# Patient Record
Sex: FEMALE | Race: WHITE | Employment: UNEMPLOYED | URBAN - METROPOLITAN AREA
[De-identification: names, ages, dates, MRNs, and addresses within clinical notes are randomized per-mention and may not be internally consistent; named-entity substitution may affect disease eponyms.]

---

## 2018-01-08 ENCOUNTER — OFFICE VISIT (OUTPATIENT)
Dept: OBGYN CLINIC | Age: 18
End: 2018-01-08

## 2018-01-08 VITALS
BODY MASS INDEX: 50.02 KG/M2 | WEIGHT: 293 LBS | HEIGHT: 64 IN | SYSTOLIC BLOOD PRESSURE: 124 MMHG | DIASTOLIC BLOOD PRESSURE: 82 MMHG

## 2018-01-08 DIAGNOSIS — Z01.419 WELL FEMALE EXAM WITH ROUTINE GYNECOLOGICAL EXAM: Primary | ICD-10-CM

## 2018-01-08 RX ORDER — DROSPIRENONE AND ETHINYL ESTRADIOL 0.02-3(28)
1 KIT ORAL DAILY
Qty: 3 PACKAGE | Refills: 4 | Status: SHIPPED | OUTPATIENT
Start: 2018-01-08 | End: 2019-06-11 | Stop reason: SDUPTHER

## 2018-01-08 NOTE — PROGRESS NOTES
Annual exam ages 21-44    Ke Engle is a No obstetric history on file. ,  21 y.o. female [facility] Patient's last menstrual period was 01/01/2018 (approximate). .    She presents for her annual checkup. She is having significant dysmenorrhea. With regard to the Gardasil vaccine, she has received all 3 injections. Menstrual status:    Her periods are moderate in flow. She is using three to five pads or tampons per day, usually regular and last 26-30 days. She admits dysmenorrhea. She reports no premenstrual symptoms. Contraception:    The current method of family planning is condoms always. Sexual history:     She  reports that she currently engages in sexual activity and has had male partners. She reports using the following method of birth control/protection: Condom. .    Medical conditions:    Since her last annual GYN exam about one year ago, she has not the following changes in her health history: none. Pap and Mammogram History:    She has never had a pap smear. The patient has never had a mammogram.    Breast Cancer History/Substance Abuse: negative    History reviewed. No pertinent past medical history. History reviewed. No pertinent surgical history. Current Outpatient Prescriptions   Medication Sig Dispense Refill    medroxyPROGESTERone (DEPO-PROVERA) 150 mg/mL injection Administer 1mL by intramuscular route. 1 mL 1    HYDROcodone-acetaminophen (NORCO) 5-325 mg per tablet Take 1 Tab by mouth every four (4) hours as needed for Pain. Max Daily Amount: 6 Tabs. No driving on this medication 12 Tab 0    norgestimate-ethinyl estradiol (ORTHO TRI-CYCLEN, TRI-SPRINTEC) 0.18/0.215/0.25 mg-35 mcg (28) tablet Take 1 tablet by mouth daily. 3 Package 4     Allergies: Review of patient's allergies indicates no known allergies. Tobacco History:  reports that she has been smoking.   She has never used smokeless tobacco.  Alcohol Abuse:  reports that she does not drink alcohol. Drug Abuse:  reports that she does not use illicit drugs.     Family Medical/Cancer History:   Family History   Problem Relation Age of Onset    No Known Problems Mother         Review of Systems - History obtained from the patient  Constitutional: negative for weight loss, fever, night sweats  HEENT: negative for hearing loss, earache, congestion, snoring, sorethroat  CV: negative for chest pain, palpitations, edema  Resp: negative for cough, shortness of breath, wheezing  GI: negative for change in bowel habits, abdominal pain, black or bloody stools  : negative for frequency, dysuria, hematuria, vaginal discharge  MSK: negative for back pain, joint pain, muscle pain  Breast: negative for breast lumps, nipple discharge, galactorrhea  Skin :negative for itching, rash, hives  Neuro: negative for dizziness, headache, confusion, weakness  Psych: negative for anxiety, depression, change in mood  Heme/lymph: negative for bleeding, bruising, pallor    Physical Exam    Visit Vitals    /82    Ht 5' 4\" (1.626 m)    Wt 300 lb (136.1 kg)    LMP 01/01/2018 (Approximate)    BMI 51.49 kg/m2       Constitutional  · Appearance: well-nourished, well developed, alert, in no acute distress    HENT  · Head and Face: appears normal    Neck  · Inspection/Palpation: normal appearance, no masses or tenderness  · Lymph Nodes: no lymphadenopathy present  · Thyroid: gland size normal, nontender, no nodules or masses present on palpation    Chest  · Respiratory Effort: breathing unlabored    Gastrointestinal  · Abdominal Examination: abdomen non-tender to palpation, normal bowel sounds, no masses present  · Liver and spleen: no hepatomegaly present, spleen not palpable  · Hernias: no hernias identified    Skin  · General Inspection: no rash, no lesions identified    Neurologic/Psychiatric  · Mental Status:  · Orientation: grossly oriented to person, place and time  · Mood and Affect: mood normal, affect appropriate    Assessment:  Routine gynecologic examination  Her current medical status is satisfactory with no evidence of significant gynecologic issues. Dysmenorrhea- will start ocps, can take continuously, also discussed taking aleve with placebo pills.     Plan:  Counseled re: diet, exercise, healthy lifestyle  Return for yearly wellness visits

## 2018-01-10 LAB
C TRACH RRNA SPEC QL NAA+PROBE: NEGATIVE
N GONORRHOEA RRNA SPEC QL NAA+PROBE: NEGATIVE
T VAGINALIS RRNA SPEC QL NAA+PROBE: NEGATIVE

## 2018-10-25 ENCOUNTER — OFFICE VISIT (OUTPATIENT)
Dept: FAMILY MEDICINE CLINIC | Age: 18
End: 2018-10-25

## 2018-10-25 VITALS
SYSTOLIC BLOOD PRESSURE: 119 MMHG | BODY MASS INDEX: 42.68 KG/M2 | RESPIRATION RATE: 16 BRPM | WEIGHT: 250 LBS | HEART RATE: 88 BPM | HEIGHT: 64 IN | TEMPERATURE: 98.5 F | DIASTOLIC BLOOD PRESSURE: 80 MMHG | OXYGEN SATURATION: 98 %

## 2018-10-25 DIAGNOSIS — J40 BRONCHITIS: Primary | ICD-10-CM

## 2018-10-25 PROBLEM — E66.01 OBESITY, MORBID (HCC): Status: ACTIVE | Noted: 2018-10-25

## 2018-10-25 RX ORDER — ALBUTEROL SULFATE 90 UG/1
2 AEROSOL, METERED RESPIRATORY (INHALATION)
Qty: 1 INHALER | Refills: 1 | Status: SHIPPED | OUTPATIENT
Start: 2018-10-25 | End: 2022-06-29 | Stop reason: ALTCHOICE

## 2018-10-25 RX ORDER — AZITHROMYCIN 250 MG/1
TABLET, FILM COATED ORAL
Qty: 6 TAB | Refills: 0 | Status: SHIPPED | OUTPATIENT
Start: 2018-10-25 | End: 2018-10-30

## 2018-10-25 NOTE — PROGRESS NOTES
Raffaele James is a 21 y.o. female presenting for Cough (CONGESTION & BACK PAIN DUE TO COUGH   COUGH STARTED ABOUT 2 WEEKS AGO) Awful cough for 2 weeks Went to [facility] ER 3 weeks ago for sore throat and told was viral 
Cough, runny nose and sore throat, HA, Wheezing and dyspnic Sputum producitve, yellow No history of asthma or pulm disease No unusual exposures but works at [employer] office Smokes 1/4 ppd Discussed quitting On ocp's Review of Systems Constitutional: Negative for chills and fever. Respiratory: Positive for cough, sputum production, shortness of breath and wheezing. Negative for hemoptysis. Cardiovascular: Positive for chest pain (with coughing). History reviewed. No pertinent past medical history. History reviewed. No pertinent surgical history. Family History Problem Relation Age of Onset  No Known Problems Mother  No Known Problems Father Social History Socioeconomic History  Marital status: SINGLE Spouse name: Not on file  Number of children: Not on file  Years of education: Not on file  Highest education level: Not on file Social Needs  Financial resource strain: Not on file  Food insecurity - worry: Not on file  Food insecurity - inability: Not on file  Transportation needs - medical: Not on file  Transportation needs - non-medical: Not on file Occupational History  Not on file Tobacco Use  Smoking status: Current Every Day Smoker  Smokeless tobacco: Never Used Substance and Sexual Activity  Alcohol use: No  
 Drug use: No  
 Sexual activity: Yes  
  Partners: Male Birth control/protection: Condom Other Topics Concern  Not on file Social History Narrative  Not on file Current Outpatient Medications Medication Sig Dispense Refill  azithromycin (ZITHROMAX) 250 mg tablet Take 2 tablets today, then take 1 tablet daily 6 Tab 0  
  albuterol (PROVENTIL HFA, VENTOLIN HFA, PROAIR HFA) 90 mcg/actuation inhaler Take 2 Puffs by inhalation every six (6) hours as needed for Wheezing. 1 Inhaler 1  
 drospirenone-ethinyl estradiol (AFIA) 3-0.02 mg tab Take 1 Tab by mouth daily. 3 Package 4  
 HYDROcodone-acetaminophen (NORCO) 5-325 mg per tablet Take 1 Tab by mouth every four (4) hours as needed for Pain. Max Daily Amount: 6 Tabs. No driving on this medication 12 Tab 0 No Known Allergies Vitals:  
 10/25/18 7335 BP: 119/80 Pulse: 88 Resp: 16 Temp: 98.5 °F (36.9 °C) TempSrc: Oral  
SpO2: 98% Weight: 250 lb (113.4 kg) Height: 5' 4\" (1.626 m) Body mass index is 42.91 kg/m². Objective General: Patient alert and oriented and in NAD HEENT: PER/EOMI, no conjunctival pallor or scleral icterus. No thyromegaly or cervical lymphadenopathy Clear rhinorrhea Heart: Regular rate and rhythm, No murmurs, rubs or gallops. Lungs: Clear to auscultation bilaterally, no wheezing, rales or rhonchi Ext: No edema Skin: No rashes or lesions noted on exposed skin Neuro: AAOx3 Psych: Appropriate mood and affect Assessment and Plan: 1. Bronchitis Smoker, duration over 2 weeks will rx with antibx. Home, steam, advil and menthol cough drops QUIT SMOKING!!! 
- azithromycin (ZITHROMAX) 250 mg tablet; Take 2 tablets today, then take 1 tablet daily  Dispense: 6 Tab; Refill: 0 
- albuterol (PROVENTIL HFA, VENTOLIN HFA, PROAIR HFA) 90 mcg/actuation inhaler; Take 2 Puffs by inhalation every six (6) hours as needed for Wheezing. Dispense: 1 Inhaler; Refill: 1 May see us PRN for weight management-discussed Diagnosis and plan discussed with patient who verbillized understanding. Follow-up Disposition: Not on File [facility]

## 2018-10-25 NOTE — PROGRESS NOTES
Identified pt with two pt identifiers(name and ). Chief Complaint Patient presents with  Cough CONGESTION & BACK PAIN DUE TO COUGH   COUGH STARTED ABOUT 2 WEEKS AGO Health Maintenance Due Topic  DTaP/Tdap/Td series (1 - Tdap)  HPV Age 9Y-34Y (1 - Female 3-dose series)  Pneumococcal 19-64 Medium Risk (1 of 1 - PPSV23)  Influenza Age 5 to Adult Wt Readings from Last 3 Encounters:  
10/25/18 250 lb (113.4 kg) (>99 %, Z= 2.52)*  
18 300 lb (136.1 kg) (>99 %, Z= 2.80)*  
16 260 lb 11.2 oz (118.3 kg) (>99 %, Z= 2.52)* * Growth percentiles are based on CDC (Girls, 2-20 Years) data. Temp Readings from Last 3 Encounters:  
10/25/18 98.5 °F (36.9 °C) (Oral) 12/05/15 98.2 °F (36.8 °C) BP Readings from Last 3 Encounters:  
10/25/18 119/80 (74 %, Z = 0.66 /  94 %, Z = 1.58)*  
18 124/82 (86 %, Z = 1.09 /  96 %, Z = 1.78)*  
12/06/15 121/67 (85 %, Z = 1.03 /  56 %, Z = 0.15)*  
 
*BP percentiles are based on the 2017 AAP Clinical Practice Guideline for girls Pulse Readings from Last 3 Encounters:  
10/25/18 88  
12/05/15 112 Learning Assessment: 
:  
 
No flowsheet data found. Depression Screening: 
:  
 
PHQ over the last two weeks 10/25/2018 Little interest or pleasure in doing things Not at all Feeling down, depressed, irritable, or hopeless Not at all Total Score PHQ 2 0 Fall Risk Assessment: 
:  
 
No flowsheet data found. Abuse Screening: 
:  
 
No flowsheet data found. Coordination of Care Questionnaire: 
:  
 
1) Have you been to an emergency room, urgent care clinic since your last visit? YES   [place]  SORE THROAT/PHARYNGITIS Hospitalized since your last visit? NO          
 
2) Have you seen or consulted any other health care providers outside of [address] [place] [address] since your last visit? NO 
  (Include any pap smears or colon screenings in this section.)  NO Patient is accompanied by self I have received verbal consent from [facility] to discuss any/all medical information while they are present in the room.

## 2018-10-31 ENCOUNTER — DOCUMENTATION ONLY (OUTPATIENT)
Dept: FAMILY MEDICINE CLINIC | Age: 18
End: 2018-10-31

## 2018-10-31 NOTE — PROGRESS NOTES
KATHLEEN Fields faxed a rejection letter from the insurance on the Proventil  inhaler. Ms. Priscilla Boyd is aware of this. She states that she doesn't need it at this time, she is on an antibiotic and is improving.

## 2019-06-11 ENCOUNTER — OFFICE VISIT (OUTPATIENT)
Dept: OBGYN CLINIC | Age: 19
End: 2019-06-11

## 2019-06-11 VITALS
HEIGHT: 64 IN | DIASTOLIC BLOOD PRESSURE: 82 MMHG | BODY MASS INDEX: 49.51 KG/M2 | SYSTOLIC BLOOD PRESSURE: 124 MMHG | WEIGHT: 290 LBS

## 2019-06-11 DIAGNOSIS — N91.2 AMENORRHEA: ICD-10-CM

## 2019-06-11 DIAGNOSIS — Z01.419 WELL FEMALE EXAM WITH ROUTINE GYNECOLOGICAL EXAM: Primary | ICD-10-CM

## 2019-06-11 DIAGNOSIS — Z11.3 SCREENING EXAMINATION FOR SEXUALLY TRANSMITTED DISEASE: ICD-10-CM

## 2019-06-11 LAB
HCG URINE, QL. (POC): NEGATIVE
VALID INTERNAL CONTROL?: YES

## 2019-06-11 RX ORDER — MEDROXYPROGESTERONE ACETATE 10 MG/1
10 TABLET ORAL DAILY
Qty: 10 TAB | Refills: 0 | Status: SHIPPED | OUTPATIENT
Start: 2019-06-11 | End: 2019-06-11 | Stop reason: SDUPTHER

## 2019-06-11 RX ORDER — MEDROXYPROGESTERONE ACETATE 10 MG/1
10 TABLET ORAL DAILY
Qty: 10 TAB | Refills: 0 | Status: SHIPPED | OUTPATIENT
Start: 2019-06-11 | End: 2022-06-29

## 2019-06-11 RX ORDER — DROSPIRENONE AND ETHINYL ESTRADIOL 0.02-3(28)
1 KIT ORAL DAILY
Qty: 3 PACKAGE | Refills: 4 | Status: SHIPPED | OUTPATIENT
Start: 2019-06-11 | End: 2019-12-18

## 2019-06-11 NOTE — PATIENT INSTRUCTIONS
Safer Sex: Care Instructions  Your Care Instructions  Safer sex is a way to reduce your risk of getting an infection spread through sex. It can also help prevent pregnancy. Most infections that are spread through sex, also called sexually transmitted infections or STIs, can be cured. But some can decrease your chances of getting pregnant if they are not treated early. Others, such as herpes, have no cure. And some, such as HIV, can be deadly. Several products can help you practice safer sex and reduce your chance of STIs. One of the best is a condom. There are condoms for men and for women. The female condom is a tube of soft plastic with a closed end that is placed deep into the vagina. You can use a special rubber sheet (dental dam) for protection during oral sex. Latex gloves can keep your hands from touching blood, semen, or other body fluids that can carry infections. Remember that birth control methods such as diaphragms, IUDs, foams, and birth control pills do not stop you from getting STIs. Follow-up care is a key part of your treatment and safety. Be sure to make and go to all appointments, and call your doctor if you are having problems. It's also a good idea to know your test results and keep a list of the medicines you take. How can you care for yourself at home? · Think about getting shots to prevent hepatitis A and hepatitis B. These two diseases can be spread through sex. You also can get hepatitis A if you eat infected food. · Use condoms or female condoms each time and every time you have sex. · Learn the right way to use a male condom:  ? Condoms come in several sizes. Make sure you use the right size. A condom that is too small can break easily. A condom that is too big can slip off during sex. Use a new condom each time you have sex. ? Be careful not to poke a hole in the condom when you open the wrapper. ? Squeeze the tip of the condom to keep out air.   ? Pull down the loose skin (foreskin) from the head of an uncircumcised penis. ? While squeezing the tip of the condom, unroll it all the way down to the base of the firm penis. ? Never use petroleum jelly (such as Vaseline), grease, hand lotion, baby oil, or anything with oil in it. These products can make holes in the condom. ? After sex, hold the condom on your penis as you remove your penis from your partner. This will keep semen from spilling out of the condom. · Learn to use a female condom:  ? You can put in a female condom up to 8 hours before sex. ? Squeeze the smaller ring at the closed end and insert it deep into the vagina. The larger ring at the open end should stay outside the vagina. ? During sex, make sure the penis goes into the condom. ? After the penis is removed, close the open end of the condom by twisting it. Remove the condom. · Do not use a female condom and male condom at the same time. · Do not have sex with anyone who has symptoms of an STI, such as sores on the genitals or mouth. The herpes virus that causes cold sores can spread to and from the penis and vagina. · Do not drink a lot of alcohol or use drugs before sex. This can cause you to let down your guard and not practice safer sex. · Having one sex partner (who does not have STIs and does not have sex with anyone else) is a sure way to avoid STIs. · Talk to your partner before you have sex. Find out if he or she has or is at risk for any STI. Keep in mind that a person may be able to spread an STI even if he or she does not have symptoms. You and your partner may want to get an HIV test. You should get tested again 6 months later. Where can you learn more? Go to http://selena-charles.info/. Enter H795 in the search box to learn more about \"Safer Sex: Care Instructions. \"  Current as of: September 11, 2018  Content Version: 11.9  © 7543-9275 [employer].  Care instructions adapted under license by [employer] (which disclaims liability or warranty for this information). If you have questions about a medical condition or this instruction, always ask your healthcare professional. [address] any warranty or liability for your use of this information.

## 2019-06-11 NOTE — PROGRESS NOTES
Annual exam ages [age]    Rosario San is a No obstetric history on file. ,  19 y.o. female [facility] No LMP recorded. .    She presents for her annual checkup. She is having no significant problems. She has not had a period since stopping OCP's 4 months ago. With regard to the Gardasil vaccine, she has received all 3 injections. Menstrual status:    Her periods are have been absent since stopping OCP's 4 months ago. She denies dysmenorrhea. She reports no premenstrual symptoms. Contraception:    The current method of family planning is condoms. Sexual history:     She  reports that she currently engages in sexual activity and has had partners who are Male. She reports using the following method of birth control/protection: Condom. .    Medical conditions:    Since her last annual GYN exam about one year ago, she has not the following changes in her health history: none. Pap and Mammogram History:    She has never had a pap smear. The patient has never had a mammogram.    Breast Cancer History/Substance Abuse: negative    History reviewed. No pertinent past medical history. History reviewed. No pertinent surgical history. Current Outpatient Medications   Medication Sig Dispense Refill    albuterol (PROVENTIL HFA, VENTOLIN HFA, PROAIR HFA) 90 mcg/actuation inhaler Take 2 Puffs by inhalation every six (6) hours as needed for Wheezing. 1 Inhaler 1    drospirenone-ethinyl estradiol (AFIA) 3-0.02 mg tab Take 1 Tab by mouth daily. 3 Package 4    HYDROcodone-acetaminophen (NORCO) 5-325 mg per tablet Take 1 Tab by mouth every four (4) hours as needed for Pain. Max Daily Amount: 6 Tabs. No driving on this medication 12 Tab 0     Allergies: Patient has no known allergies. Tobacco History:  reports that she has been smoking. She has never used smokeless tobacco.  Alcohol Abuse:  reports that she does not drink alcohol.   Drug Abuse:  reports that she does not use drugs.     Family Medical/Cancer History:   Family History   Problem Relation Age of Onset    No Known Problems Mother     No Known Problems Father         Review of Systems - History obtained from the patient  Constitutional: negative for weight loss, fever, night sweats  HEENT: negative for hearing loss, earache, congestion, snoring, sorethroat  CV: negative for chest pain, palpitations, edema  Resp: negative for cough, shortness of breath, wheezing  GI: negative for change in bowel habits, abdominal pain, black or bloody stools  : negative for frequency, dysuria, hematuria, vaginal discharge  MSK: negative for back pain, joint pain, muscle pain  Breast: negative for breast lumps, nipple discharge, galactorrhea  Skin :negative for itching, rash, hives  Neuro: negative for dizziness, headache, confusion, weakness  Psych: negative for anxiety, depression, change in mood  Heme/lymph: negative for bleeding, bruising, pallor    Physical Exam    Visit Vitals  /82   Ht 5' 4\" (1.626 m)   Wt 290 lb (131.5 kg)   BMI 49.78 kg/m²       Constitutional  · Appearance: well-nourished, well developed, alert, in no acute distress    HENT  · Head and Face: appears normal    Neck  · Inspection/Palpation: normal appearance, no masses or tenderness  · Lymph Nodes: no lymphadenopathy present  · Thyroid: gland size normal, nontender, no nodules or masses present on palpation    Chest  · Respiratory Effort: breathing unlabored    Gastrointestinal  · Abdominal Examination: abdomen non-tender to palpation, normal bowel sounds, no masses present  · Liver and spleen: no hepatomegaly present, spleen not palpable  · Hernias: no hernias identified    Skin  · General Inspection: no rash, no lesions identified    Neurologic/Psychiatric  · Mental Status:  · Orientation: grossly oriented to person, place and time  · Mood and Affect: mood normal, affect appropriate    Assessment:  Routine gynecologic examination  Her current medical status is satisfactory with no evidence of significant gynecologic issues. Amenorrhea- likely anovulatory, will give provera withdrawal and start ocps, will check pcos labs, tsh, and prolactin.     Plan:  Counseled re: diet, exercise, healthy lifestyle  Return for yearly wellness visits

## 2019-06-13 LAB
17OHP SERPL-MCNC: 70 NG/DL
ALBUMIN SERPL-MCNC: 4.5 G/DL (ref 3.5–5.5)
ALBUMIN/GLOB SERPL: 1.6 {RATIO} (ref 1.2–2.2)
ALP SERPL-CCNC: 71 IU/L (ref 39–117)
ALT SERPL-CCNC: 21 IU/L (ref 0–32)
AST SERPL-CCNC: 18 IU/L (ref 0–40)
BILIRUB SERPL-MCNC: 0.4 MG/DL (ref 0–1.2)
BUN SERPL-MCNC: 11 MG/DL (ref 6–20)
BUN/CREAT SERPL: 14 (ref 9–23)
C TRACH RRNA SPEC QL NAA+PROBE: NEGATIVE
CALCIUM SERPL-MCNC: 9.3 MG/DL (ref 8.7–10.2)
CHLORIDE SERPL-SCNC: 102 MMOL/L (ref 96–106)
CO2 SERPL-SCNC: 24 MMOL/L (ref 20–29)
CREAT SERPL-MCNC: 0.81 MG/DL (ref 0.57–1)
FT4I SERPL CALC-MCNC: 1.6 (ref 1.2–4.9)
GLOBULIN SER CALC-MCNC: 2.8 G/DL (ref 1.5–4.5)
GLUCOSE SERPL-MCNC: 84 MG/DL (ref 65–99)
N GONORRHOEA RRNA SPEC QL NAA+PROBE: NEGATIVE
POTASSIUM SERPL-SCNC: 4.4 MMOL/L (ref 3.5–5.2)
PROLACTIN SERPL-MCNC: 13 NG/ML (ref 4.8–23.3)
PROT SERPL-MCNC: 7.3 G/DL (ref 6–8.5)
SODIUM SERPL-SCNC: 141 MMOL/L (ref 134–144)
T VAGINALIS RRNA VAG QL NAA+PROBE: NEGATIVE
T3RU NFR SERPL: 23 % (ref 24–39)
T4 SERPL-MCNC: 7.1 UG/DL (ref 4.5–12)
TESTOST FREE SERPL-MCNC: 6.3 PG/ML (ref 0–4.2)
TESTOST SERPL-MCNC: 68 NG/DL (ref 8–48)
TSH SERPL DL<=0.005 MIU/L-ACNC: 1.87 UIU/ML (ref 0.45–4.5)

## 2019-12-18 ENCOUNTER — TELEPHONE (OUTPATIENT)
Dept: OBGYN CLINIC | Age: 19
End: 2019-12-18

## 2019-12-18 RX ORDER — DROSPIRENONE AND ETHINYL ESTRADIOL 0.02-3(28)
KIT ORAL
Qty: 3 PACKAGE | Refills: 3 | Status: SHIPPED | OUTPATIENT
Start: 2019-12-18 | End: 2022-06-29

## 2019-12-18 NOTE — LETTER
12/19/2019 10:22 AM 
 
Ms. [address] [address] To whom it may concern, 
Ms Pratibha Abad is under my care and she may be excused from work for today 12/19/19. Ms Pratibha Abad can return to work on 12/20/19 without restrictions. Please call the office with any further questions or concerns at [phone]. Thank you Sincerely, Nida Ohara MD

## 2019-12-18 NOTE — TELEPHONE ENCOUNTER
Call received at 11:25am      22year old last seen in the office on 6/11/19 . Patient was advised of MD reviewed labs for PCOS. Patient had a prescription sent at that visit that did not go( print)    Patient calling to say that she had not had any bleeding till this am and now she is bleeding since about 5am and has gone through a super plus tampon in one hour and has cramping at 10 on the pain scale of  1-10. Patient advised of prescription to be sent to pharmacy for ocp taper and how to take the medication. Patient advised to take rx with food. Patient advised she can take motrin 600mg every 4-6 hours ( OTC)    Patient repeated back how take the medication. Patient provided with bleeding and pain precautions. Prescription sent as per MD order to patient confirmed pharmacy. Patient verbalized understanding.

## 2019-12-19 NOTE — TELEPHONE ENCOUNTER
It takes a little bit for the taper to kick in, it should get better today. Can take ibuprofen 600 mg 1 po q6 hours if needed and use warm compresses. Needs to check UPT and call back asap if positive. Can have a note to be out of work. Also needs to schedule an appointment with an ultrasound (once bleeding has decreased. If bleeding increases also needs to call back then. Should remain on the birth control after bleeding resolves to help cycle her in the future so that she does not go so long without a period.

## 2019-12-19 NOTE — TELEPHONE ENCOUNTER
Patient advised of MD recommendations and verbalized understanding. Letter composed as per MD order, printed,and signed and waiting for patient to call back with a fax number to fax the letter. Patient verbalized understanding regarding MD recommendations.

## 2019-12-19 NOTE — TELEPHONE ENCOUNTER
Call received at 8:45am      22year old patient calling back again to say that she is still having pain at 9 on the pain scale of 1-10 and is changing a tampon every 1.5 hours. Patient is taking the ocp taper and has taken 2 pills twice yesterday and 2 pills today    Patient is taking extra strength midol with minimal relief. Patient is stating she cant go to work and needs a note for work    ? ov    Please advise

## 2019-12-19 NOTE — TELEPHONE ENCOUNTER
This nurse called the patient back and to get the fax number.   Letter faxed to patient provided fax number of [phone]      Fax confirmation received

## 2020-11-01 ENCOUNTER — APPOINTMENT (OUTPATIENT)
Dept: GENERAL RADIOLOGY | Age: 20
End: 2020-11-01
Attending: EMERGENCY MEDICINE
Payer: COMMERCIAL

## 2020-11-01 ENCOUNTER — HOSPITAL ENCOUNTER (EMERGENCY)
Age: 20
Discharge: HOME | End: 2020-11-01
Attending: EMERGENCY MEDICINE
Payer: COMMERCIAL

## 2020-11-01 ENCOUNTER — APPOINTMENT (OUTPATIENT)
Dept: CT IMAGING | Age: 20
End: 2020-11-01
Attending: EMERGENCY MEDICINE
Payer: COMMERCIAL

## 2020-11-01 VITALS
HEART RATE: 125 BPM | TEMPERATURE: 98.6 F | OXYGEN SATURATION: 99 % | SYSTOLIC BLOOD PRESSURE: 121 MMHG | RESPIRATION RATE: 20 BRPM | BODY MASS INDEX: 44.98 KG/M2 | WEIGHT: 270 LBS | DIASTOLIC BLOOD PRESSURE: 76 MMHG | HEIGHT: 65 IN

## 2020-11-01 DIAGNOSIS — S00.03XA CONTUSION OF SCALP, INITIAL ENCOUNTER: Primary | ICD-10-CM

## 2020-11-01 DIAGNOSIS — S63.617A SPRAIN OF LEFT LITTLE FINGER, UNSPECIFIED SITE OF DIGIT, INITIAL ENCOUNTER: ICD-10-CM

## 2020-11-01 PROCEDURE — 70450 CT HEAD/BRAIN W/O DYE: CPT

## 2020-11-01 PROCEDURE — 73130 X-RAY EXAM OF HAND: CPT

## 2020-11-01 PROCEDURE — 99284 EMERGENCY DEPT VISIT MOD MDM: CPT

## 2020-11-01 PROCEDURE — 74011250637 HC RX REV CODE- 250/637: Performed by: EMERGENCY MEDICINE

## 2020-11-01 RX ORDER — IBUPROFEN 800 MG/1
800 TABLET ORAL
Qty: 20 TAB | Refills: 0 | Status: SHIPPED | OUTPATIENT
Start: 2020-11-01 | End: 2020-11-08

## 2020-11-01 RX ORDER — IBUPROFEN 800 MG/1
800 TABLET ORAL ONCE
Status: COMPLETED | OUTPATIENT
Start: 2020-11-01 | End: 2020-11-01

## 2020-11-01 RX ORDER — ACETAMINOPHEN 500 MG
1000 TABLET ORAL ONCE
Status: COMPLETED | OUTPATIENT
Start: 2020-11-01 | End: 2020-11-01

## 2020-11-01 RX ADMIN — IBUPROFEN 800 MG: 800 TABLET ORAL at 03:40

## 2020-11-01 RX ADMIN — ACETAMINOPHEN 1000 MG: 500 TABLET, FILM COATED ORAL at 03:40

## 2020-11-01 NOTE — ED PROVIDER NOTES
EMERGENCY DEPARTMENT HISTORY AND PHYSICAL EXAM      Date: 11/1/2020  Patient Name: Lucie Castleman    History of Presenting Illness     Chief Complaint   Patient presents with    Headache     pt states she fell out of a moving truck tonight and L 5th finger pain as well. Pt states she has been drinking 5-6 beers tonight       History Provided By: Patient    HPI: Lucie Castleman, 23 y.o. female   presents to the ED with cc of headache and hand pain. Patient states that she has been drinking EtOH and fell out of a moving truck to spinal . Patient states that she landed on her head and the left hand. Patient complains of mild headache. Patient denies LOC. No visual changes. Patient complains of left fifth finger pain. No neck pain or back pain. PCP: Michael Enrique MD    No current facility-administered medications on file prior to encounter. Current Outpatient Medications on File Prior to Encounter   Medication Sig Dispense Refill    drospirenone-ethinyl estradiol (AFIA) 3-0.02 mg tab Take 2 pills twice a day for two days then, take on pill twice a day for 2 days then, take one pill a day, skip the placebos and start the new pack- take on every day from the new pack 3 Package 3    medroxyPROGESTERone (PROVERA) 10 mg tablet Take 1 Tab by mouth daily. 10 Tab 0    albuterol (PROVENTIL HFA, VENTOLIN HFA, PROAIR HFA) 90 mcg/actuation inhaler Take 2 Puffs by inhalation every six (6) hours as needed for Wheezing. 1 Inhaler 1    HYDROcodone-acetaminophen (NORCO) 5-325 mg per tablet Take 1 Tab by mouth every four (4) hours as needed for Pain. Max Daily Amount: 6 Tabs. No driving on this medication 12 Tab 0       Past History     Past Medical History:  Past Medical History:   Diagnosis Date    PCOS (polycystic ovarian syndrome)        Past Surgical History:  History reviewed. No pertinent surgical history.     Family History:  Family History   Problem Relation Age of Onset    No Known Problems Mother     No Known Problems Father        Social History:  Social History     Tobacco Use    Smoking status: Current Every Day Smoker     Packs/day: 0.50    Smokeless tobacco: Never Used   Substance Use Topics    Alcohol use: Yes     Alcohol/week: 5.0 standard drinks     Types: 5 Cans of beer per week    Drug use: No       Allergies:  No Known Allergies      Review of Systems   Review of Systems   Constitutional: Negative for chills and fever. HENT: Negative for nosebleeds. Respiratory: Negative for shortness of breath. Cardiovascular: Negative for chest pain. Gastrointestinal: Negative for nausea. Endocrine: Negative for polyuria. Genitourinary: Negative for dysuria. Musculoskeletal: Negative for back pain and neck pain. Skin: Negative for rash. Neurological: Positive for headaches. All other systems reviewed and are negative. Physical Exam   Physical Exam  Vitals signs and nursing note reviewed. Constitutional:       General: She is not in acute distress. Appearance: Normal appearance. HENT:      Head: Normocephalic and atraumatic. Comments: Mild occipital scalp tenderness without hematoma or depressed skull     Right Ear: External ear normal.      Left Ear: External ear normal.      Nose: Nose normal.      Mouth/Throat:      Mouth: Mucous membranes are moist.   Eyes:      Extraocular Movements: Extraocular movements intact. Pupils: Pupils are equal, round, and reactive to light. Neck:      Musculoskeletal: Neck supple. Cardiovascular:      Rate and Rhythm: Normal rate and regular rhythm. Heart sounds: Normal heart sounds. Pulmonary:      Effort: Pulmonary effort is normal.      Breath sounds: Normal breath sounds. Abdominal:      General: Abdomen is flat. Bowel sounds are normal.      Palpations: Abdomen is soft. Musculoskeletal: Normal range of motion. General: No tenderness, deformity or signs of injury.       Comments: Left hand without deformity or swelling. Left fifth finger with a painful ROM without swelling or deformity   Skin:     General: Skin is warm and dry. Neurological:      General: No focal deficit present. Mental Status: She is alert and oriented to person, place, and time. Psychiatric:         Behavior: Behavior normal.         Diagnostic Study Results     Labs -   No results found for this or any previous visit (from the past 12 hour(s)). Radiologic Studies -   XR HAND LT MIN 3 V   Final Result   Impression:   No acute osseous abnormality. CT HEAD WO CONT   Final Result   Impression:   No acute intracranial abnormality. CT Results  (Last 48 hours)               11/01/20 0310  CT HEAD WO CONT Final result    Impression:  Impression:   No acute intracranial abnormality. Narrative:  Study: Head CT without contrast.       Clinical indication: Head injury. Technique: Routine volume acquisition of the head was obtained without IV   contrast. Coronal and sagittal reformations were generated in soft tissue and   bone kernels. Dose reduction: All CT scans at this facility are performed using   dose reduction optimization techniques as appropriate to a performed exam   including the following: Automated exposure control, adjustments of the mA   and/or kV according to patient size, or use of iterative reconstruction   technique. Comparison: None available. Findings:        No evidence of acute intracranial hemorrhage, mass effect, midline shift or   abnormal extra-axial fluid collection. Ventricles and basal cisterns are   preserved and are symmetric. Gray-white matter differentiation is maintained. Extracalvarial soft tissues are unremarkable. No evidence of skull fracture. Visualized paranasal sinuses and mastoid air cells are clear. Globes and orbits   are intact.                    CXR Results  (Last 48 hours)    None            Medical Decision Making   I am the first provider for this patient. I reviewed the vital signs, available nursing notes, past medical history, past surgical history, family history and social history. Vital Signs-Reviewed the patient's vital signs. Patient Vitals for the past 12 hrs:   Temp Pulse Resp BP SpO2   11/01/20 0242 98.6 °F (37 °C) (!) 125 20 121/76 99 %       Records Reviewed:     Provider Notes (Medical Decision Making):       ED Course:   Initial assessment performed. The patients presenting problems have been discussed, and they are in agreement with the care plan formulated and outlined with them. I have encouraged them to ask questions as they arise throughout their visit. PROCEDURES        PLAN:  1. Current Discharge Medication List        2. Follow-up Information     Follow up With Specialties Details Why Contact Info    Follow up with your primary care physician  Schedule an appointment as soon as possible for a visit in 3 days As needed         Return to ED if worse     Diagnosis     Clinical Impression:   1. Contusion of scalp, initial encounter    2.  Sprain of left little finger, unspecified site of digit, initial encounter

## 2022-03-20 PROBLEM — E66.01 OBESITY, MORBID (HCC): Status: ACTIVE | Noted: 2018-10-25

## 2022-06-08 ENCOUNTER — HOSPITAL ENCOUNTER (EMERGENCY)
Age: 22
Discharge: HOME | End: 2022-06-08
Attending: EMERGENCY MEDICINE
Payer: COMMERCIAL

## 2022-06-08 VITALS
HEART RATE: 90 BPM | DIASTOLIC BLOOD PRESSURE: 95 MMHG | TEMPERATURE: 98.3 F | HEIGHT: 65 IN | RESPIRATION RATE: 18 BRPM | OXYGEN SATURATION: 100 % | BODY MASS INDEX: 43.32 KG/M2 | WEIGHT: 260 LBS | SYSTOLIC BLOOD PRESSURE: 130 MMHG

## 2022-06-08 DIAGNOSIS — L03.115 CELLULITIS OF RIGHT LOWER EXTREMITY: Primary | ICD-10-CM

## 2022-06-08 PROCEDURE — 99284 EMERGENCY DEPT VISIT MOD MDM: CPT

## 2022-06-08 PROCEDURE — 96372 THER/PROPH/DIAG INJ SC/IM: CPT

## 2022-06-08 PROCEDURE — 74011250636 HC RX REV CODE- 250/636: Performed by: EMERGENCY MEDICINE

## 2022-06-08 RX ORDER — KETOROLAC TROMETHAMINE 10 MG/1
10 TABLET, FILM COATED ORAL
Qty: 20 TABLET | Refills: 0 | Status: SHIPPED | OUTPATIENT
Start: 2022-06-08 | End: 2022-06-08 | Stop reason: SDUPTHER

## 2022-06-08 RX ORDER — SULFAMETHOXAZOLE AND TRIMETHOPRIM 800; 160 MG/1; MG/1
2 TABLET ORAL 2 TIMES DAILY
Qty: 28 TABLET | Refills: 0 | Status: SHIPPED | OUTPATIENT
Start: 2022-06-08 | End: 2022-06-08 | Stop reason: SDUPTHER

## 2022-06-08 RX ORDER — SULFAMETHOXAZOLE AND TRIMETHOPRIM 800; 160 MG/1; MG/1
2 TABLET ORAL 2 TIMES DAILY
Qty: 28 TABLET | Refills: 0 | Status: SHIPPED | OUTPATIENT
Start: 2022-06-08 | End: 2022-06-15

## 2022-06-08 RX ORDER — KETOROLAC TROMETHAMINE 30 MG/ML
30 INJECTION, SOLUTION INTRAMUSCULAR; INTRAVENOUS
Status: COMPLETED | OUTPATIENT
Start: 2022-06-08 | End: 2022-06-08

## 2022-06-08 RX ORDER — KETOROLAC TROMETHAMINE 10 MG/1
10 TABLET, FILM COATED ORAL
Qty: 20 TABLET | Refills: 0 | Status: SHIPPED | OUTPATIENT
Start: 2022-06-08 | End: 2022-06-13

## 2022-06-08 RX ADMIN — KETOROLAC TROMETHAMINE 30 MG: 30 INJECTION, SOLUTION INTRAMUSCULAR; INTRAVENOUS at 05:39

## 2022-06-08 NOTE — ED TRIAGE NOTES
Patient presents with right foot pain. [facility] and was dx with cellulitis. Did not get RX filled. Aleve taken at 0330 today. Symptoms began Sunday.

## 2022-06-08 NOTE — Clinical Note
[address] [facility] EMERGENCY DEPARTMENT  [address] [facility] [address]  [phone]    Work/School Note    Date: 6/8/2022    To Whom It May concern:    Ileana Trivedi was seen and treated today in the emergency room by the following provider(s):  Attending Provider: Moon Doty MD.      Ileana Trivedi is excused from work/school on 06/08/22 and 06/09/22. She is medically clear to return to work/school on 6/10/2022.        Sincerely,          Bala Walters MD

## 2022-06-08 NOTE — ED PROVIDER NOTES
EMERGENCY DEPARTMENT HISTORY AND PHYSICAL EXAM      Date: 6/8/2022  Patient Name: Alee Wiley    History of Presenting Illness     Chief Complaint   Patient presents with    Foot Pain    Skin Problem       History Provided By: Patient    HPI: Alee Wiley, 19 y.o. female with a past medical history significant No significant past medical history presents to the ED with cc of right foot pain and redness x3 days. Patient states she was seen yesterday at an urgent care facility and was discharged with Keflex but did not have a chance to fill it. Patient reports the pain worsened this morning at the base of her first toe, worse with palpation or movement, improved with rest.    There are no other complaints, changes, or physical findings at this time. PCP: Jose Angel Butts MD    No current facility-administered medications on file prior to encounter. Current Outpatient Medications on File Prior to Encounter   Medication Sig Dispense Refill    drospirenone-ethinyl estradiol (AFIA) 3-0.02 mg tab Take 2 pills twice a day for two days then, take on pill twice a day for 2 days then, take one pill a day, skip the placebos and start the new pack- take on every day from the new pack (Patient not taking: Reported on 6/8/2022) 3 Package 3    medroxyPROGESTERone (PROVERA) 10 mg tablet Take 1 Tab by mouth daily. (Patient not taking: Reported on 6/8/2022) 10 Tab 0    albuterol (PROVENTIL HFA, VENTOLIN HFA, PROAIR HFA) 90 mcg/actuation inhaler Take 2 Puffs by inhalation every six (6) hours as needed for Wheezing. (Patient not taking: Reported on 6/8/2022) 1 Inhaler 1    HYDROcodone-acetaminophen (NORCO) 5-325 mg per tablet Take 1 Tab by mouth every four (4) hours as needed for Pain. Max Daily Amount: 6 Tabs.  No driving on this medication (Patient not taking: Reported on 6/8/2022) 12 Tab 0       Past History     Past Medical History:  Past Medical History:   Diagnosis Date    PCOS (polycystic ovarian syndrome) Past Surgical History:  History reviewed. No pertinent surgical history. Family History:  Family History   Problem Relation Age of Onset    No Known Problems Mother     No Known Problems Father        Social History:  Social History     Tobacco Use    Smoking status: Former Smoker     Packs/day: 0.50    Smokeless tobacco: Never Used    Tobacco comment: Quit 3 months ago - March 2022   Substance Use Topics    Alcohol use: Yes     Alcohol/week: 5.0 standard drinks     Types: 5 Cans of beer per week     Comment: occasionally    Drug use: No       Allergies:  No Known Allergies      Review of Systems   Review of Systems   Constitutional: Negative for chills and fever. HENT: Negative for sinus pressure and sinus pain. Eyes: Negative for photophobia and redness. Respiratory: Negative for shortness of breath and wheezing. Cardiovascular: Negative for chest pain and palpitations. Gastrointestinal: Negative for abdominal pain and nausea. Genitourinary: Negative for flank pain and hematuria. Musculoskeletal: Negative for arthralgias and gait problem. Skin: Positive for redness, negative for pallor. Neurological: Negative for dizziness and weakness. Review of Systems    Physical Exam   Physical Exam  Constitutional:       General: No acute distress. Appearance: Normal appearance. Not toxic-appearing. HENT:      Head: Normocephalic and atraumatic. Nose: Nose normal.      Mouth/Throat:      Mouth: Mucous membranes are moist.   Eyes:      Extraocular Movements: Extraocular movements intact. Pupils: Pupils are equal, round, and reactive to light. Cardiovascular:      Rate and Rhythm: Normal rate. Pulses: Normal pulses. Pulmonary:      Effort: Pulmonary effort is normal.      Breath sounds: No stridor. Abdominal:      General: Abdomen is flat. There is no distension. Musculoskeletal:         General: Normal range of motion.       Cervical back: Normal range of motion and neck supple. Skin:     General: Skin is warm and dry. 1.5 cm circular area of erythema and redness at the plantar side of the base of the first toe which is blanchable, no fluctuance or abscess noted     Capillary Refill: Capillary refill takes less than 2 seconds. Neurological:      General: No focal deficit present. Mental Status: Aert and oriented to person, place, and time. Psychiatric:         Mood and Affect: Mood normal.         Behavior: Behavior normal.       Physical Exam    Lab and Diagnostic Study Results     Labs -   No results found for this or any previous visit (from the past 12 hour(s)). Radiologic Studies -   @lastxrresult@  CT Results  (Last 48 hours)    None        CXR Results  (Last 48 hours)    None            Medical Decision Making   - I am the first provider for this patient. - I reviewed the vital signs, available nursing notes, past medical history, past surgical history, family history and social history. - Initial assessment performed. The patients presenting problems have been discussed, and they are in agreement with the care plan formulated and outlined with them. I have encouraged them to ask questions as they arise throughout their visit. Vital Signs-Reviewed the patient's vital signs. Patient Vitals for the past 12 hrs:   Temp Pulse Resp BP SpO2   06/08/22 0514 98.3 °F (36.8 °C) 90 18 (!) 130/95 100 %       Provider Notes (Medical Decision Making):   Patient brought her records from the urgent care facility which showed a white count of 13, an unremarkable x-ray of the foot. Discussed plan to start Bactrim as well as Keflex, cc over-the-counter NSAIDs and will write for ketorolac. Discussed need for close follow-up as well as return precautions. MDM           Disposition   Disposition: DC- Adult Discharges: All of the diagnostic tests were reviewed and questions answered. Diagnosis, care plan and treatment options were discussed.   The patient understands the instructions and will follow up as directed. The patients results have been reviewed with them. They have been counseled regarding their diagnosis. The patient verbally convey understanding and agreement of the signs, symptoms, diagnosis, treatment and prognosis and additionally agrees to follow up as recommended with their PCP in 24 - 48 hours. They also agree with the care-plan and convey that all of their questions have been answered. I have also put together some discharge instructions for them that include: 1) educational information regarding their diagnosis, 2) how to care for their diagnosis at home, as well a 3) list of reasons why they would want to return to the ED prior to their follow-up appointment, should their condition change. Discharged    DISCHARGE PLAN:  1. Current Discharge Medication List      CONTINUE these medications which have NOT CHANGED    Details   drospirenone-ethinyl estradiol (AFIA) 3-0.02 mg tab Take 2 pills twice a day for two days then, take on pill twice a day for 2 days then, take one pill a day, skip the placebos and start the new pack- take on every day from the new pack  Qty: 3 Package, Refills: 3      medroxyPROGESTERone (PROVERA) 10 mg tablet Take 1 Tab by mouth daily. Qty: 10 Tab, Refills: 0      albuterol (PROVENTIL HFA, VENTOLIN HFA, PROAIR HFA) 90 mcg/actuation inhaler Take 2 Puffs by inhalation every six (6) hours as needed for Wheezing. Qty: 1 Inhaler, Refills: 1    Associated Diagnoses: Bronchitis      HYDROcodone-acetaminophen (NORCO) 5-325 mg per tablet Take 1 Tab by mouth every four (4) hours as needed for Pain. Max Daily Amount: 6 Tabs. No driving on this medication  Qty: 12 Tab, Refills: 0           2. Follow-up Information     Follow up With Specialties Details Why Contact Info    Cristy Salinas DPM Podiatry  As needed [address]  [phone]          3. Return to ED if worse   4. Current Discharge Medication List      START taking these medications    Details   trimethoprim-sulfamethoxazole (Bactrim DS) 160-800 mg per tablet Take 2 Tablets by mouth two (2) times a day for 7 days. Qty: 28 Tablet, Refills: 0  Start date: 6/8/2022, End date: 6/15/2022      ketorolac (TORADOL) 10 mg tablet Take 1 Tablet by mouth every six (6) hours as needed for Pain for up to 5 days. Qty: 20 Tablet, Refills: 0  Start date: 6/8/2022, End date: 6/13/2022               Diagnosis     Clinical Impression:   1. Cellulitis of right lower extremity        Attestations:    Jimmie Martinez MD    Please note that this dictation was completed with [employer], the computer voice recognition software. Quite often unanticipated grammatical, syntax, homophones, and other interpretive errors are inadvertently transcribed by the computer software. Please disregard these errors. Please excuse any errors that have escaped final proofreading. Thank you.

## 2022-06-20 ENCOUNTER — OFFICE VISIT (OUTPATIENT)
Dept: PODIATRY | Age: 22
End: 2022-06-20
Payer: COMMERCIAL

## 2022-06-20 VITALS
HEART RATE: 97 BPM | BODY MASS INDEX: 48.82 KG/M2 | SYSTOLIC BLOOD PRESSURE: 156 MMHG | DIASTOLIC BLOOD PRESSURE: 84 MMHG | TEMPERATURE: 97.5 F | HEIGHT: 65 IN | WEIGHT: 293 LBS | OXYGEN SATURATION: 94 %

## 2022-06-20 DIAGNOSIS — M25.871 SESAMOIDITIS OF RIGHT FOOT: ICD-10-CM

## 2022-06-20 DIAGNOSIS — Z09 HOSPITAL DISCHARGE FOLLOW-UP: ICD-10-CM

## 2022-06-20 DIAGNOSIS — M10.9 GOUT OF RIGHT FOOT, UNSPECIFIED CAUSE, UNSPECIFIED CHRONICITY: Primary | ICD-10-CM

## 2022-06-20 PROCEDURE — 99203 OFFICE O/P NEW LOW 30 MIN: CPT | Performed by: PODIATRIST

## 2022-06-20 PROCEDURE — 1111F DSCHRG MED/CURRENT MED MERGE: CPT | Performed by: PODIATRIST

## 2022-06-20 NOTE — PROGRESS NOTES
1. Have you been to the ER, urgent care clinic since your last visit? Hospitalized since your last visit? Yes Where: [facility]    2. Have you seen or consulted any other health care providers outside of the [address] [place] [address] since your last visit? Include any pap smears or colon screening.  No     Chief Complaint   Patient presents with   [facility] Follow Up     cellulitis

## 2022-06-22 DIAGNOSIS — M10.9 GOUT OF RIGHT FOOT, UNSPECIFIED CAUSE, UNSPECIFIED CHRONICITY: Primary | ICD-10-CM

## 2022-06-22 LAB — URATE SERPL-MCNC: 6.7 MG/DL (ref 2.6–6.2)

## 2022-06-22 RX ORDER — ALLOPURINOL 100 MG/1
100 TABLET ORAL DAILY
Qty: 30 TABLET | Refills: 2 | Status: SHIPPED | OUTPATIENT
Start: 2022-06-22 | End: 2022-08-17

## 2022-06-22 NOTE — PROGRESS NOTES
SUA elevated. Pt does have gout.  Will need to start allopurinol 100mg PO every day and we can redraw labs in (4) weeks

## 2022-06-29 ENCOUNTER — OFFICE VISIT (OUTPATIENT)
Dept: PRIMARY CARE CLINIC | Age: 22
End: 2022-06-29
Payer: COMMERCIAL

## 2022-06-29 VITALS
SYSTOLIC BLOOD PRESSURE: 118 MMHG | HEIGHT: 65 IN | TEMPERATURE: 97.4 F | RESPIRATION RATE: 20 BRPM | WEIGHT: 293 LBS | OXYGEN SATURATION: 98 % | HEART RATE: 89 BPM | DIASTOLIC BLOOD PRESSURE: 73 MMHG | BODY MASS INDEX: 48.82 KG/M2

## 2022-06-29 DIAGNOSIS — M25.871 SESAMOIDITIS OF RIGHT FOOT: ICD-10-CM

## 2022-06-29 DIAGNOSIS — E66.01 MORBID OBESITY (HCC): ICD-10-CM

## 2022-06-29 DIAGNOSIS — E28.2 PCOS (POLYCYSTIC OVARIAN SYNDROME): ICD-10-CM

## 2022-06-29 DIAGNOSIS — M10.071 ACUTE IDIOPATHIC GOUT INVOLVING TOE OF RIGHT FOOT: Primary | ICD-10-CM

## 2022-06-29 PROCEDURE — 99203 OFFICE O/P NEW LOW 30 MIN: CPT | Performed by: FAMILY MEDICINE

## 2022-06-29 NOTE — PROGRESS NOTES
HPI     Chief Complaint:   Chief Complaint   Patient presents with   [facility].     Gout       Alee Wiley is an 19 y.o. female here to establish care. No previous PCP. Medical history significant for:   Patient Active Problem List   Diagnosis Code    Obesity, morbid ([facility] [address] E66.01    Acute idiopathic gout involving toe of right foot M10.071    PCOS (polycystic ovarian syndrome) E28.2       Concerns for today's visit:  Gout: patient had spontaneous right foot pain, she went to [facility] first and was given Keflex but when symptoms did not improve she went to ER and was diagnosed with cellulitis as well and given bactrim in addition to the keflex. She followed up with Podiatry and a uric acid was elevated, she was started on allopurinol and pain has improved significantly. She denies ever having a rash or trauma. Obesity: no dietary changes. Weight Metrics 6/29/2022 6/20/2022 6/8/2022 11/1/2020 6/11/2019 10/25/2018 1/8/2018   Weight 307 lb 3.2 oz 303 lb 12.8 oz 260 lb 270 lb 290 lb 250 lb 300 lb   BMI 51.12 kg/m2 50.55 kg/m2 43.27 kg/m2 44.93 kg/m2 49.78 kg/m2 42.91 kg/m2 51.49 kg/m2     PCOS: recent diagnosis per OB. Recommended OCPs which patient is not currently taking and does not desire to take. Allergies - reviewed:   No Known Allergies    Past Medical History - reviewed:  Past Medical History:   Diagnosis Date    Gout     PCOS (polycystic ovarian syndrome)        Past Surgical History - reviewed:  History reviewed. No pertinent surgical history. Immunizations - reviewed: There is no immunization history on file for this patient. Review of Systems   Review of Systems   Cardiovascular: Negative for chest pain and palpitations. Musculoskeletal: Positive for joint pain. Negative for myalgias. Reviewed PmHx, FmHx, SocHx as well as meds and allergies, updated and dated in the chart.        Objective     Visit Vitals  /73 (BP 1 Location: Right upper arm, BP Patient Position: Sitting, BP Cuff Size: Large adult)   Pulse 89   Temp 97.4 °F (36.3 °C) (Temporal)   Resp 20   Ht 5' 5\" (1.651 m)   Wt 307 lb 3.2 oz (139.3 kg)   LMP 06/28/2022   SpO2 98%   BMI 51.12 kg/m²       Physical Exam  Vitals and nursing note reviewed. Constitutional:       Appearance: Normal appearance. She is obese. Cardiovascular:      Rate and Rhythm: Normal rate. Rhythm irregular. Heart sounds: No murmur heard. Pulmonary:      Effort: Pulmonary effort is normal. No respiratory distress. Breath sounds: Normal breath sounds. Musculoskeletal:      Right lower leg: No edema. Left lower leg: No edema. Comments: Minimal tenderness to palpation of the right 1st metatarsal joint. Tenderness to palpation at ball of foot, at base of distal first tarsal, not at joint line. Skin:     General: Skin is warm. Coloration: Skin is not jaundiced. Neurological:      Mental Status: She is alert. Psychiatric:         Mood and Affect: Mood normal.         Behavior: Behavior normal.      Comments: Pleasant. Assessment and Plan     Diagnoses and all orders for this visit:    1. Acute idiopathic gout involving toe of right foot  Comments:  Continue allopurinol. Will have repeat uric acid per recs from Podiatry. Risk factor modification. Orders:  -     REFERRAL TO DIETITIAN    2. Sesamoiditis of right foot  Comments:  Patient to wear good support shoes consistently, avoid sandals. NSAIDs prn and ice prn. 3. Morbid obesity ([address]  Comments:  Discussed healthy lifestyle and dietary changes. Referral to dietician. Orders:  -     REFERRAL TO DIETITIAN    4. PCOS (polycystic ovarian syndrome)  Comments:  May benefit from metformin to help with weight management as well.  Discussed today, patient will try dietician first.          Follow-up and Dispositions    · Return in about 3 months (around 9/29/2022) for annual physical.         I discussed the aforementioned diagnoses with the patient as well as the plan of care. All questions were answered and an AVS was provided.        Yeison Cordoba MD  [facility]  [facility], [address]

## 2022-06-29 NOTE — PROGRESS NOTES
1. \"Have you been to the ER, urgent care clinic since your last visit? Hospitalized since your last visit? \" No    2. \"Have you seen or consulted any other health care providers outside of the [address] [place] [address] since your last visit? \" No     3. For patients aged 39-70: Has the patient had a colonoscopy / FIT/ Cologuard? NA - based on age      If the patient is female:    4. For patients aged 41-77: Has the patient had a mammogram within the past 2 years? NA - based on age or sex      11. For patients aged 21-65: Has the patient had a pap smear?  NA - based on age or sex  Visit Vitals  /73 (BP 1 Location: Right upper arm, BP Patient Position: Sitting, BP Cuff Size: Large adult)   Pulse 89   Temp 97.4 °F (36.3 °C) (Temporal)   Resp 20   Ht 5' 5\" (1.651 m)   Wt 307 lb 3.2 oz (139.3 kg)   LMP 06/28/2022   SpO2 98%   BMI 51.12 kg/m²     Chief Complaint   Patient presents with   [address]     Gout

## 2022-07-15 ENCOUNTER — OFFICE VISIT (OUTPATIENT)
Dept: OBGYN CLINIC | Age: 22
End: 2022-07-15
Payer: COMMERCIAL

## 2022-07-15 VITALS — BODY MASS INDEX: 50.09 KG/M2 | SYSTOLIC BLOOD PRESSURE: 114 MMHG | DIASTOLIC BLOOD PRESSURE: 69 MMHG | WEIGHT: 293 LBS

## 2022-07-15 DIAGNOSIS — E28.2 PCOS (POLYCYSTIC OVARIAN SYNDROME): ICD-10-CM

## 2022-07-15 DIAGNOSIS — N93.9 ABNORMAL UTERINE BLEEDING (AUB): Primary | ICD-10-CM

## 2022-07-15 PROCEDURE — 99203 OFFICE O/P NEW LOW 30 MIN: CPT | Performed by: OBSTETRICS & GYNECOLOGY

## 2022-07-15 RX ORDER — TRANEXAMIC ACID 650 1/1
1300 TABLET ORAL
Qty: 30 TABLET | Refills: 11 | Status: SHIPPED | OUTPATIENT
Start: 2022-07-15

## 2022-07-15 RX ORDER — METFORMIN HYDROCHLORIDE 500 MG/1
500 TABLET ORAL 2 TIMES DAILY WITH MEALS
Qty: 360 TABLET | Refills: 4 | Status: SHIPPED | OUTPATIENT
Start: 2022-07-15

## 2022-07-15 NOTE — PROGRESS NOTES
PCOS note      Tori Middleton is a 19 y.o. female who complains of PCOS. Her current method of family planning is none. She c/o 42-44 day cycles that are painful and heavy. She also c/o increased facial hair. She previously had PCOS labs in 2019. Her relevant past medical history:   Past Medical History:   Diagnosis Date    Gout     PCOS (polycystic ovarian syndrome)         No past surgical history on file. Social History     Occupational History    Not on file   Tobacco Use    Smoking status: Former Smoker     Packs/day: 0.50    Smokeless tobacco: Never Used    Tobacco comment: Quit 3 months ago - March 2022   Vaping Use    Vaping Use: Never used   Substance and Sexual Activity    Alcohol use: Yes     Alcohol/week: 5.0 standard drinks     Types: 5 Cans of beer per week     Comment: occasionally    Drug use: No    Sexual activity: Yes     Partners: Male     Birth control/protection: Condom     Family History   Problem Relation Age of Onset    No Known Problems Mother     No Known Problems Father        No Known Allergies  Prior to Admission medications    Medication Sig Start Date End Date Taking? Authorizing Provider   allopurinoL (ZYLOPRIM) 100 mg tablet Take 1 Tablet by mouth daily.  6/22/22  Yes Velvet [place]a DPM        Review of Systems - History obtained from the patient  Constitutional: negative for weight loss, fever, night sweats  HEENT: negative for hearing loss, earache, congestion, snoring, sorethroat  CV: negative for chest pain, palpitations, edema  Resp: negative for cough, shortness of breath, wheezing  Breast: negative for breast lumps, nipple discharge, galactorrhea  GI: negative for change in bowel habits, abdominal pain, black or bloody stools  : negative for frequency, dysuria, hematuria  MSK: negative for back pain, joint pain, muscle pain  Skin: negative for itching, rash, hives  Neuro: negative for dizziness, headache, confusion, weakness  Psych: negative for anxiety, depression, change in mood  Heme/lymph: negative for bleeding, bruising, pallor      Objective:    Visit Vitals  /69   Wt 301 lb (136.5 kg)   LMP 06/28/2022   BMI 50.09 kg/m²          PHYSICAL EXAMINATION    Constitutional  · Appearance: well-nourished, well developed, alert, in no acute distress    HENT  · Head and Face: appears normal    Neck  · Inspection/Palpation: normal appearance, no masses or tenderness  · Lymph Nodes: no lymphadenopathy present  · Thyroid: gland size normal, nontender, no nodules or masses present on palpation    Gastrointestinal  · Abdominal Examination: abdomen non-tender to palpation, normal bowel sounds, no masses present  · Liver and spleen: no hepatomegaly present, spleen not palpable  · Hernias: no hernias identified    Skin  · General Inspection: no rash, no lesions identified    Neurologic/Psychiatric  · Mental Status:  · Orientation: grossly oriented to person, place and time  · Mood and Affect: mood normal, affect appropriate    Assessment/Plan:   AUB- likely anovulatory due to PCOS, will start metformin and lysteda as cycles are heavy. Interested in pregnancy, will check pcos labs, and progesterone to evaluate for ovulation, if anovulatory will start Femara when she is ready. Encouraged weight loos    Instructions given to pt. Handouts given to pt.

## 2022-07-18 ENCOUNTER — LAB ONLY (OUTPATIENT)
Dept: OBGYN CLINIC | Age: 22
End: 2022-07-18

## 2022-07-18 DIAGNOSIS — N93.9 ABNORMAL UTERINE BLEEDING (AUB): Primary | ICD-10-CM

## 2022-07-19 LAB
ALBUMIN SERPL-MCNC: 3.6 G/DL (ref 3.5–5)
ALBUMIN/GLOB SERPL: 1 {RATIO} (ref 1.1–2.2)
ALP SERPL-CCNC: 57 U/L (ref 45–117)
ALT SERPL-CCNC: 24 U/L (ref 12–78)
ANION GAP SERPL CALC-SCNC: 5 MMOL/L (ref 5–15)
AST SERPL-CCNC: 20 U/L (ref 15–37)
BILIRUB SERPL-MCNC: 0.2 MG/DL (ref 0.2–1)
BUN SERPL-MCNC: 14 MG/DL (ref 6–20)
BUN/CREAT SERPL: 16 (ref 12–20)
CALCIUM SERPL-MCNC: 9.2 MG/DL (ref 8.5–10.1)
CHLORIDE SERPL-SCNC: 107 MMOL/L (ref 97–108)
CO2 SERPL-SCNC: 28 MMOL/L (ref 21–32)
CREAT SERPL-MCNC: 0.85 MG/DL (ref 0.55–1.02)
GLOBULIN SER CALC-MCNC: 3.6 G/DL (ref 2–4)
GLUCOSE SERPL-MCNC: 91 MG/DL (ref 65–100)
POTASSIUM SERPL-SCNC: 4.4 MMOL/L (ref 3.5–5.1)
PROLACTIN SERPL-MCNC: 9.6 NG/ML
PROT SERPL-MCNC: 7.2 G/DL (ref 6.4–8.2)
SODIUM SERPL-SCNC: 140 MMOL/L (ref 136–145)
TSH SERPL DL<=0.05 MIU/L-ACNC: 1.66 UIU/ML (ref 0.36–3.74)

## 2022-07-20 LAB — PROGEST SERPL-MCNC: 0.1 NG/ML

## 2022-07-20 NOTE — PROGRESS NOTES
[facility]    Subjective:         Patient is a 19 y.o. female who is being seen as a new pt for right foot pain. Pt states she presented to a local urgent care facility, XR were taken (read as negative) and she was sent home with oral abx. She states her right foot pain did not resolve, thus she presented to the [facility] ED, additional oral abx were rx'ed and she was referred to our office for further workup/management. Patient reports the pain is worse in her first toe, worse with palpation or movement and improved with rest. She denies any trauma. Denies any systemic signs of infx and denies any recent changes in her shoe gear. She denies any other LE complaints. Past Medical History:   Diagnosis Date    Gout     PCOS (polycystic ovarian syndrome)      History reviewed. No pertinent surgical history. Family History   Problem Relation Age of Onset    No Known Problems Mother     No Known Problems Father       Social History     Tobacco Use    Smoking status: Former Smoker     Packs/day: 0.50    Smokeless tobacco: Never Used    Tobacco comment: Quit 3 months ago - March 2022   Substance Use Topics    Alcohol use: Yes     Alcohol/week: 5.0 standard drinks     Types: 5 Cans of beer per week     Comment: occasionally     No Known Allergies  Prior to Admission medications    Medication Sig Start Date End Date Taking? Authorizing Provider   metFORMIN (GLUCOPHAGE) 500 mg tablet Take 1 Tablet by mouth two (2) times daily (with meals). Start 500 mg x 1wk, then 500 mg bid x 1wk then 500 mg 2 po qam and 1 po qpm x 1wk then 2 po bid 7/15/22   Marcela More MD   tranexamic acid (LYSTEDA) 650 mg tab tablet Take 2 Tablets by mouth three (3) times daily as needed (heavy bleedeing). Take only during days of heavy bleeding 7/15/22   Marcela More MD   allopurinoL (ZYLOPRIM) 100 mg tablet Take 1 Tablet by mouth daily.  6/22/22   Pop Light DPM       Review of Systems Constitutional: Negative. HENT: Negative. Eyes: Negative. Respiratory: Negative. Cardiovascular: Negative. Gastrointestinal: Negative. Endocrine: Negative. Genitourinary: Negative. Musculoskeletal: Negative. Skin: Negative. Allergic/Immunologic: Negative. Neurological: Negative. Hematological: Negative. Psychiatric/Behavioral: Negative. All other systems reviewed and are negative. Objective:     Visit Vitals  BP (!) 156/84 (BP 1 Location: Left upper arm, BP Patient Position: Sitting, BP Cuff Size: Large adult)   Pulse 97   Temp 97.5 °F (36.4 °C) (Temporal)   Ht 5' 5\" (1.651 m)   Wt 303 lb 12.8 oz (137.8 kg)   SpO2 94%   BMI 50.55 kg/m²       Physical Exam  Vitals reviewed. Constitutional:       Appearance: She is morbidly obese. Cardiovascular:      Pulses:           Dorsalis pedis pulses are 2+ on the right side and 2+ on the left side. Posterior tibial pulses are 2+ on the right side and 2+ on the left side. Pulmonary:      Effort: Pulmonary effort is normal.   Musculoskeletal:      Right lower leg: Edema present. Left lower leg: No edema. Right foot: Decreased range of motion. No deformity or bunion. Left foot: Normal range of motion. No deformity or bunion. Feet:      Right foot:      Protective Sensation: 10 sites tested. 10 sites sensed. Skin integrity: Erythema and warmth present. Toenail Condition: Right toenails are normal.      Left foot:      Protective Sensation: 10 sites tested. 10 sites sensed. Skin integrity: Skin integrity normal.      Toenail Condition: Left toenails are normal.   Lymphadenopathy:      Lower Body: No right inguinal adenopathy. No left inguinal adenopathy. Skin:     General: Skin is warm. Capillary Refill: Capillary refill takes 2 to 3 seconds. Neurological:      Mental Status: She is alert and oriented to person, place, and time.    Psychiatric:         Mood and Affect: Mood and affect normal.         Behavior: Behavior is cooperative. Data Review: No results found for this or any previous visit (from the past 24 hour(s)). Impression:       ICD-10-CM ICD-9-CM    1. Gout of right foot, unspecified cause, unspecified chronicity  M10.9 274.9 URIC ACID   2. Sesamoiditis of right foot  M25.871 733.99 MRI FOOT RT WO CONT       Recommendation:     Patient seen and evaluated in the office  Personally reviewed recent urgent care and ED documentation  Orders given for SUA and MRI of the right foot. Once diagnostics performed, will discuss tx options in more detail        Channing Cox, [facility], [address] [place] [address] and [facility]  [address] [place] [address]  O: [phone]  F: [phone]  C: [phone]

## 2022-07-21 LAB
TESTOST FREE SERPL-MCNC: 2.9 PG/ML (ref 0–4.2)
TESTOST SERPL-MCNC: 36 NG/DL (ref 13–71)

## 2022-07-22 LAB — 17OHP SERPL-MCNC: 43 NG/DL

## 2022-08-17 RX ORDER — ALLOPURINOL 100 MG/1
100 TABLET ORAL DAILY
Qty: 90 TABLET | Refills: 0 | Status: SHIPPED | OUTPATIENT
Start: 2022-08-17

## 2023-02-24 ENCOUNTER — VIRTUAL VISIT (OUTPATIENT)
Dept: PRIMARY CARE CLINIC | Age: 23
End: 2023-02-24
Payer: COMMERCIAL

## 2023-02-24 DIAGNOSIS — E66.01 OBESITY, MORBID (HCC): Primary | ICD-10-CM

## 2023-02-24 PROCEDURE — 99213 OFFICE O/P EST LOW 20 MIN: CPT | Performed by: FAMILY MEDICINE

## 2023-02-24 NOTE — PROGRESS NOTES
Jeremiah Benavides (: 2000) is a 23 y.o. female, established patient, here for evaluation of the following chief complaint(s):   Medication Evaluation       ASSESSMENT/PLAN:  Below is the assessment and plan developed based on review of pertinent history, labs, studies, and medications. We discussed exercise and healthy lifestyle. Patient to increase exercise to 150 minutes moderate exercise weekly. We discussed GLP-1 medication, Mounjaro, Qsymia, Contrave. We will check A1c and proceed based on those results. 1. Obesity, morbid ([address]  -     HEMOGLOBIN A1C WITH EAG  -     REFERRAL TO DIETITIAN    No follow-ups on file. SUBJECTIVE/OBJECTIVE:  Patient wants to lose weight. She's heard of Mounjaro and ozempic. No hx of prediabetes but has a history of PCOS. She's struggled with her weight since childhood. She is currently walking 3 times a week. She is trying calorie counting. She's tried multiple diets before such as keto but nothing caused sustained weight loss. Review of Systems   Respiratory:  Negative for cough and shortness of breath. Cardiovascular:  Negative for chest pain and palpitations. Psychiatric/Behavioral:  Negative for hallucinations and self-injury. No data recorded     Physical Exam  Nursing note reviewed. Constitutional:       General: She is not in acute distress. Appearance: She is obese. Pulmonary:      Effort: Pulmonary effort is normal. No respiratory distress. Neurological:      Mental Status: She is alert. Jeremiah Benavides, was evaluated through a synchronous (real-time) audio-video encounter. The patient (or guardian if applicable) is aware that this is a billable service, which includes applicable co-pays. This Virtual Visit was conducted with patient's (and/or legal guardian's) consent.  The visit was conducted pursuant to the emergency declaration under the [facility] waiver authority and the Coronavirus Preparedness and Response Supplemental Appropriations Act. Patient identification was verified, and a caregiver was present when appropriate. The patient was located at: Home: [address] [facility] [address]  The provider was located at: Home: [id number] was used to authenticate this note.   -- Zeenat Arciniega MD

## 2023-02-24 NOTE — PROGRESS NOTES
There were no vitals taken for this visit. Chief Complaint   Patient presents with    Medication Evaluation       1. \"Have you been to the ER, urgent care clinic since your last visit? Hospitalized since your last visit? \" No    2. \"Have you seen or consulted any other health care providers outside of the [address] [place] [address] since your last visit? \" No     3. For patients aged 39-70: Has the patient had a colonoscopy / FIT/ Cologuard? NA - based on age      If the patient is female:    4. For patients aged 41-77: Has the patient had a mammogram within the past 2 years? NA - based on age or sex      11. For patients aged 21-65: Has the patient had a pap smear?  No

## 2023-08-22 ENCOUNTER — OFFICE VISIT (OUTPATIENT)
Age: 23
End: 2023-08-22
Payer: COMMERCIAL

## 2023-08-22 VITALS
OXYGEN SATURATION: 97 % | SYSTOLIC BLOOD PRESSURE: 134 MMHG | BODY MASS INDEX: 48.32 KG/M2 | WEIGHT: 290 LBS | HEART RATE: 91 BPM | HEIGHT: 65 IN | DIASTOLIC BLOOD PRESSURE: 82 MMHG

## 2023-08-22 DIAGNOSIS — T16.1XXA FOREIGN BODY OF RIGHT EAR, INITIAL ENCOUNTER: ICD-10-CM

## 2023-08-22 DIAGNOSIS — H60.503 ACUTE OTITIS EXTERNA OF BOTH EARS, UNSPECIFIED TYPE: Primary | ICD-10-CM

## 2023-08-22 DIAGNOSIS — T16.2XXA FOREIGN BODY OF LEFT EAR, INITIAL ENCOUNTER: ICD-10-CM

## 2023-08-22 PROCEDURE — 69200 CLEAR OUTER EAR CANAL: CPT | Performed by: NURSE PRACTITIONER

## 2023-08-22 PROCEDURE — 99204 OFFICE O/P NEW MOD 45 MIN: CPT | Performed by: NURSE PRACTITIONER

## 2023-08-22 RX ORDER — CIPROFLOXACIN AND DEXAMETHASONE 3; 1 MG/ML; MG/ML
4 SUSPENSION/ DROPS AURICULAR (OTIC) 2 TIMES DAILY
Qty: 7.5 ML | Refills: 0 | Status: SHIPPED | OUTPATIENT
Start: 2023-08-22 | End: 2023-08-29

## 2023-08-22 ASSESSMENT — ENCOUNTER SYMPTOMS
GASTROINTESTINAL NEGATIVE: 1
EYES NEGATIVE: 1
RESPIRATORY NEGATIVE: 1

## 2023-08-22 NOTE — PROGRESS NOTES
Subjective:    Gerber Hagen   22 y.o.   2000     New Patient Visit  This is a 23 y.o. Minerva Beckwith sex who is here today to discuss issues with her ears. Location - both ears     Quality - pain    Severity -  mild to moderate     Duration - has been getting more frequent over about 1.5 years     Timing - intermittent     Context - She states for about the past year and a half she has been getting ear infections with about 2 week intervals between infections and they will become infected again. It will alternate from left to right side. Antibiotics seem to make it better. Modifying Features - none     Associated symptoms/signs - pain, drainage       Review of Systems  Review of Systems   Constitutional: Negative. HENT:  Positive for ear discharge and ear pain. Eyes: Negative. Respiratory: Negative. Cardiovascular: Negative. Gastrointestinal: Negative. Endocrine: Negative. Genitourinary: Negative. Musculoskeletal: Negative. Skin: Negative. Allergic/Immunologic: Positive for environmental allergies. Neurological: Negative. Hematological: Negative. Psychiatric/Behavioral: Negative. Past Medical History:   Diagnosis Date    Gout     PCOS (polycystic ovarian syndrome)      History reviewed. No pertinent surgical history. Family History   Problem Relation Age of Onset    No Known Problems Mother     No Known Problems Father      Social History     Tobacco Use    Smoking status: Every Day     Packs/day: 0.25     Years: 0.50     Pack years: 0.13     Types: Cigarettes, E-Cigarettes    Smokeless tobacco: Never    Tobacco comments:     Quit smoking: Quit 3 months ago - March 2022   Substance Use Topics    Alcohol use: Yes      Prior to Admission medications    Medication Sig Start Date End Date Taking?  Authorizing Provider   allopurinol (ZYLOPRIM) 100 MG tablet Take 100 mg by mouth daily 8/17/22   Ar Automatic Reconciliation        No Known Allergies      Objective:     BP

## 2023-09-13 ENCOUNTER — OFFICE VISIT (OUTPATIENT)
Age: 23
End: 2023-09-13
Payer: COMMERCIAL

## 2023-09-13 VITALS
DIASTOLIC BLOOD PRESSURE: 84 MMHG | SYSTOLIC BLOOD PRESSURE: 124 MMHG | WEIGHT: 290 LBS | OXYGEN SATURATION: 98 % | HEIGHT: 65 IN | HEART RATE: 92 BPM | BODY MASS INDEX: 48.32 KG/M2

## 2023-09-13 DIAGNOSIS — Z86.69 HISTORY OF OTITIS EXTERNA: Primary | ICD-10-CM

## 2023-09-13 PROCEDURE — 99212 OFFICE O/P EST SF 10 MIN: CPT | Performed by: NURSE PRACTITIONER

## 2023-09-13 ASSESSMENT — ENCOUNTER SYMPTOMS
RESPIRATORY NEGATIVE: 1
EYES NEGATIVE: 1
GASTROINTESTINAL NEGATIVE: 1

## 2023-09-13 NOTE — PROGRESS NOTES
Subjective:    Cedric Garcia   22 y.o.   2000     New Patient Visit  8/22/2023 This is a 23 y.o. Indigo Hamman sex who is here today to discuss issues with her ears. Location - both ears     Quality - pain    Severity -  mild to moderate     Duration - has been getting more frequent over about 1.5 years     Timing - intermittent     Context - She states for about the past year and a half she has been getting ear infections with about 2 week intervals between infections and they will become infected again. It will alternate from left to right side. Antibiotics seem to make it better. Modifying Features - none     Associated symptoms/signs - pain, drainage     Interval History   9/13/2023- Patient has returned today for follow-up after being seen about 3 weeks ago for bilateral otitis externa. Review of Systems  Review of Systems   Constitutional: Negative. HENT:  Positive for ear discharge and ear pain. Eyes: Negative. Respiratory: Negative. Cardiovascular: Negative. Gastrointestinal: Negative. Endocrine: Negative. Genitourinary: Negative. Musculoskeletal: Negative. Skin: Negative. Allergic/Immunologic: Positive for environmental allergies. Neurological: Negative. Hematological: Negative. Psychiatric/Behavioral: Negative. Past Medical History:   Diagnosis Date    Gout     PCOS (polycystic ovarian syndrome)      History reviewed. No pertinent surgical history.    Family History   Problem Relation Age of Onset    No Known Problems Mother     No Known Problems Father      Social History     Tobacco Use    Smoking status: Every Day     Packs/day: 0.25     Years: 0.50     Additional pack years: 0.00     Total pack years: 0.13     Types: Cigarettes, E-Cigarettes    Smokeless tobacco: Never    Tobacco comments:     Quit smoking: Quit 3 months ago - March 2022   Substance Use Topics    Alcohol use: Yes      Prior to Admission medications    Medication Sig Start Date End

## 2023-11-22 ENCOUNTER — TELEPHONE (OUTPATIENT)
Age: 23
End: 2023-11-22

## 2023-11-22 NOTE — TELEPHONE ENCOUNTER
Called pt to follow up on New Patient Forms for the SW Program. Pt completed Seminar but we have not received forms. Seminar:   Forms Emailed:  Follow Up Call:      Pt answered and stated she is currently at a , requested a call back in a few hours.

## 2024-01-18 ENCOUNTER — OFFICE VISIT (OUTPATIENT)
Age: 24
End: 2024-01-18
Payer: COMMERCIAL

## 2024-01-18 VITALS
SYSTOLIC BLOOD PRESSURE: 126 MMHG | BODY MASS INDEX: 48.82 KG/M2 | WEIGHT: 293 LBS | HEART RATE: 114 BPM | RESPIRATION RATE: 16 BRPM | DIASTOLIC BLOOD PRESSURE: 72 MMHG | OXYGEN SATURATION: 95 % | HEIGHT: 65 IN

## 2024-01-18 DIAGNOSIS — H60.503 ACUTE OTITIS EXTERNA OF BOTH EARS, UNSPECIFIED TYPE: Primary | ICD-10-CM

## 2024-01-18 PROCEDURE — 99213 OFFICE O/P EST LOW 20 MIN: CPT | Performed by: NURSE PRACTITIONER

## 2024-01-18 RX ORDER — FLUOCINOLONE ACETONIDE 0.11 MG/ML
3 OIL AURICULAR (OTIC) 2 TIMES DAILY
Qty: 20 ML | Refills: 1 | Status: SHIPPED | OUTPATIENT
Start: 2024-01-18

## 2024-01-18 RX ORDER — CIPROFLOXACIN AND DEXAMETHASONE 3; 1 MG/ML; MG/ML
4 SUSPENSION/ DROPS AURICULAR (OTIC) 2 TIMES DAILY
Qty: 7.5 ML | Refills: 0 | Status: SHIPPED | OUTPATIENT
Start: 2024-01-18 | End: 2024-01-25

## 2024-01-18 ASSESSMENT — ENCOUNTER SYMPTOMS
EYES NEGATIVE: 1
GASTROINTESTINAL NEGATIVE: 1
RESPIRATORY NEGATIVE: 1

## 2024-01-18 NOTE — PROGRESS NOTES
Subjective:    Jefe Turner   23 y.o.   2000     New Patient Visit  8/22/2023 This is a 23 y.o. .sex who is here today to discuss issues with her ears.   Location - both ears     Quality - pain    Severity -  mild to moderate     Duration - has been getting more frequent over about 1.5 years     Timing - intermittent     Context - She states for about the past year and a half she has been getting ear infections with about 2 week intervals between infections and they will become infected again. It will alternate from left to right side. Antibiotics seem to make it better.     Modifying Features - none     Associated symptoms/signs - pain, drainage     Interval History   9/13/2023- Patient has returned today for follow-up after being seen about 3 weeks ago for bilateral otitis externa.     1/18/2024- Ms. Turner presents today with complaints of bilateral ear pain. She states she has not had any issues until about a week ago. She states about one week ago she noted that she was having some drainage on the left side and crusting. She also has some crusting on the right. She denies pain, but states it is uncomfortable.   Review of Systems  Review of Systems   Constitutional: Negative.    HENT:  Positive for ear discharge and ear pain.    Eyes: Negative.    Respiratory: Negative.     Cardiovascular: Negative.    Gastrointestinal: Negative.    Endocrine: Negative.    Genitourinary: Negative.    Musculoskeletal: Negative.    Skin: Negative.    Allergic/Immunologic: Positive for environmental allergies.   Neurological: Negative.    Hematological: Negative.    Psychiatric/Behavioral: Negative.             Past Medical History:   Diagnosis Date    Gout     PCOS (polycystic ovarian syndrome)      History reviewed. No pertinent surgical history.   Family History   Problem Relation Age of Onset    No Known Problems Mother     No Known Problems Father      Social History     Tobacco Use    Smoking status: Every Day